# Patient Record
Sex: FEMALE | Race: BLACK OR AFRICAN AMERICAN | URBAN - METROPOLITAN AREA
[De-identification: names, ages, dates, MRNs, and addresses within clinical notes are randomized per-mention and may not be internally consistent; named-entity substitution may affect disease eponyms.]

---

## 2023-02-01 ENCOUNTER — HOSPITAL ENCOUNTER (OUTPATIENT)
Dept: PHYSICAL THERAPY | Age: 63
Setting detail: RECURRING SERIES
Discharge: HOME | End: 2023-02-04
Payer: COMMERCIAL

## 2023-02-01 PROCEDURE — 97161 PT EVAL LOW COMPLEX 20 MIN: CPT

## 2023-02-01 PROCEDURE — 97110 THERAPEUTIC EXERCISES: CPT

## 2023-02-01 NOTE — PROGRESS NOTES
Lindsayosorio Mallory  : 1960  Primary: [employer] - Open Access (hmo) (Commercial)  Secondary:  [address] @ [facility]  [address] [facility] [address]  Phone: [phone]  Fax: [phone] No data recorded  Plan of Care/Certification Expiration Date: 23      PT Visit Info:  Plan of Care/Certification Expiration Date: 23      Visit Count:  1    OUTPATIENT PHYSICAL THERAPY:OP NOTE TYPE: Treatment Note 2023       Episode  }Appt Desk             Treatment Diagnosis:  Low back pain, unspecified [M54.50]  Pain in right shoulder [M25.511]  Other chronic pain [G89.29]  Pain in left shoulder [M25.512]  Medical/Referring Diagnosis:  Low back pain, unspecified [M54.50]  Pain in right shoulder [M25.511]  Other chronic pain [G89.29]  Pain in left shoulder [M25.512]  Referring Physician:  Domingo Morales MD Orders:  PT Eval and Treat   Date of Onset:  No data recorded   Allergies:   Patient has no allergy information on record. Restrictions/Precautions:  No data recorded  No data recorded   Interventions Planned (Treatment may consist of any combination of the following):    Current Treatment Recommendations: Aquatics; Strengthening; ROM; Endurance training; Balance training     Subjective Comments:      Initial:}     6/10Post Session:        6/10  Medications Last Reviewed:  2023  Updated Objective Findings:  See evaluation note from today  Treatment   THERAPEUTIC EXERCISE: (23 minutes):    Exercises per grid below to improve mobility, strength, balance, and coordination. Required minimal visual, verbal, and manual cues to promote proper body alignment, promote proper body posture, and promote proper body mechanics. Progressed resistance, range, and repetitions as indicated. Seated SKTC, LTR, Open Book      Aquatic Therapy (- minutes): Aquatic treatment performed per flow grid for Decreased muscle strength and Decreased endurance.   Cues provided for [pstire. Assistance by therapist provided for exercise instruction. Patient has difficulty with N/A. Aquatic Exercise Log       Date  - Date   Date   Date   Date     Activity/ Exercise Parameters Parameters Parameters Parameters Parameters   Walking forward        Walking backward        Walking sideways          Marching          Goose Step          Tip toes          Heels          Lunges        Side step squats        LE Exercises          Hip Flex/Ext          Hip Abd/Add          Hip IR/ER          Calf raises          Knee Flex          Squats          Leg Circles          Step Ups        UE Exercises          Squeeze In          Push Down          Pull Down          Bicep/Tricep        Rows/Press outs         Chi Positions          Trunk Rotation        Deep H2O/ Noodles          Stabilization          Arms only          Legs only        Rosado Abarca walk        Lower abdominal   work           Cardio          Jogging        Lap   Swimming          Stretches          Hamstrings          Heelcords          Piriformis          Neck          THERAPEUTIC EXERCISE: (23 minutes):    Exercises per grid below to improve mobility, strength, balance, and coordination. Required minimal visual, verbal, and manual cues to promote proper body alignment, promote proper body posture, and promote proper body mechanics. Progressed resistance, range, and repetitions as indicated. SKTC, LTR, Open Book                Treatment/Session Summary:    Treatment Assessment: Verbalized understanding of HEP and POC. Communication/Consultation:   Faxed IE to MD  Equipment provided today:  HEP  Recommendations/Intent for next treatment session: Next visit will focus on strength, gait, balance, pain.     Total Treatment Billable Duration:  23 minutes  Time In: 1430  Time Out: [address]  }Post Session Pain  PT Visit Marcela Hubbard MD Guidelines  Scanned Media  Benefits  MyChart    No future appointments.

## 2023-02-01 NOTE — THERAPY EVALUATION
Doug Tarango  : 1960  Primary: [address] - Open Access (hmo) (Commercial)  Secondary:  [address] @ [facility]  [address] [place] [address]  Phone: [phone]  Fax: [phone] of Care/Certification Expiration Date: 23    PT Visit Info:  Plan of Care/Certification Expiration Date: 23    Visit Count:  1                OUTPATIENT PHYSICAL THERAPY:             OP NOTE TYPE: Initial Assessment 2023               Episode (Low back and shoulder pain -aquatic candidate) Appt Desk         Treatment Diagnosis:  Low back pain, unspecified [M54.50]  Pain in right shoulder [M25.511]  Other chronic pain [G89.29]  Pain in left shoulder [M25.512]    Medical/Referring Diagnosis:  Low back pain, unspecified [M54.50]  Pain in right shoulder [M25.511]  Other chronic pain [G89.29]  Pain in left shoulder [M25.512]  Referring Physician:  Burak Tello PA-C MD Orders:  PT Eval and Treat   Return MD Appt:    No future appointments. Date of Onset:       Allergies:  Patient has no allergy information on record. Restrictions/Precautions:           Medications Last Reviewed:  2023     SUBJECTIVE   History of Injury/Illness (Reason for Referral): I woke up \"on  last year with pain from my chest from top to bottom and I did nothing to bring it on. The bunt of the pain was my L hip and radiate around my back to the other side of my back. My shoulders weren't as bad, but it was like I was in a Triathalon and my body was just WORN out from it. I've been in sports before and that's what it felt like. I moved around and asked my  for something to take (medicine) and I went to work and as I walked in the door to work the pain just started stabbing on my left and R side. The pain spasmed and made me jump. I had to leave work due to the pain.   I got back to my car and I told my coworker about it and she was like, 'you may need to go home.'  I called my ronnybadn and he took me straight to the MD and from there its been a roller coaster up and down with pain. I had spasms non stop from there until the 22nd of October every single day where I could barely get up. I started gettinga little better gradually and went to therapy at [facility] October 26ish -I wasn't that bad in PT, but I still had pain. Majority of the times after therapy I hurt so bad - like I was beat up all over again. It would get better, but then went I'd go back to my therapy sessions it hurt again. I have large shoes I can slide on because I can't bend over to put my shoes on without pain. I haveb't been back to work since the 28th of September. 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 I do a lot of sitting at work. I could not go back to work right now. I am just not there back and I've tried to sit in my chair at home, but I can't do it. I've tried my best but my shoulders and back are in so much pain it's like having a kid on my shoulders the type of pressure it is. It's never ending. \"    Patient Stated Goal(s): \"To improve pain and mobility. \"  Initial:      6/10 Post Session:      6/10  She doesn't know what makes her pain level worse. But she does get up to a 10/10 at the worst.  \"I've had injections in the L shoulder but it was too painful for me to consider the R shoulder. \"  Past Medical History/Comorbidities:   Ms. Axel Lovell  has a past medical history of Hypertension and Other ill-defined conditions(279.89). Ms. Axel Lovell  has a past surgical history that includes gyn. Social History/Living Environment:   NOT WORKING AT THIS TIME        Prior Level of Function/Work/Activity:  Independent prior to Sept 28--was working full time prior to waking up Sept 28 in pain. Learning:   Does the patient/guardian have any barriers to learning?: No barriers     Fall Risk Scale:    Bowie Total Score: 25  Bowie Fall Risk: Medium (25-44)     Personal Factors:        Sex:  female        Age:  65 y.o. OBJECTIVE     Observation/Postural and Gait Assessment: Fearful at first to perform - guarded throughout lumbar movements. Palpation: Lower LS, guarded with palpation and overly tender. AROM:   Lumbar extension: She is fearful she will pass out with leaning backwards - not performed°   Lumbar flexion: + jorge's and poor motion°   Lumbar left side bend: Tightness, no pain, full motion.°   Lumbar right side bend: No pain, full motvement°     Full LE movement, no pain. Strength:  ABLE TO AMBULATE WITHOUT AD AND NO DIFFICULTY OR HX OF FALLS. Manual Muscle Test (out of 5) Left Right   Knee extension     Knee flexion     Hip flexion     Hip extension     Hip abduction     Ankle DF     Ankle PF       Special Tests:  + JORGE'S, Negative radicular symptoms/FABERS/Slump. Neurological Screen:  . Dermatomes: Sensation testing through bilateral lower quadrants for light touch is normal.  Reflexes: Patellar (L4) and Achilles (S1) are 2+ and 2+. Neural tension tests: Passive straight leg raise (SLR) test is -. Crossed SLR test is -. Slump test is -. Femoral nerve stretch test is -. Functional Mobility:  Limited due to LINO and pain. ASSESSMENT   Initial Assessment:  Pain dominant shoulder and low back prsentation. Fearful of movement---aquatic candidate. Problem List: (Impacting functional limitations): Body Structures, Functions, Activity Limitations Requiring Skilled Therapeutic Intervention: Decreased functional mobility ; Decreased ADL status; Decreased ROM;  Decreased strength; Decreased cognition; Decreased endurance     Therapy Prognosis:   Therapy Prognosis: Good     Initial Assessment Complexity:   Decision Making: Low Complexity    PLAN   Effective Dates: 2.1.23 TO Plan of Care/Certification Expiration Date: 05/02/23   Frequency/Duration:   2--3 x week  Interventions Planned (Treatment may consist of any combination of the following):    Current Treatment Recommendations: Aquatics; Strengthening; ROM; Endurance training; Balance training     Short term goals (45 days)  Radha Betancourt will be independent with aquatic exercises   Radha Betancourt will be able to tolerate 45 min of exercises with minimal rest breaks and good compliance  Radha Betancourt will be able to go up and down stairs with use of rail and modified independence. Radha Betancourt will improve LEFS by 10 points to demonstrate improvement in strength and functional tolerance. Outcome Measure: Tool Used: Lower Extremity Functional Scale (LEFS)  Score:  Initial: 38/80 Most Recent: X/80 (Date: -- )   Interpretation of Score: 20 questions each scored on a 5 point scale with 0 representing \"extreme difficulty or unable to perform\" and 4 representing \"no difficulty\". The lower the score, the greater the functional disability. 80/80 represents no disability. Minimal detectable change is 9 points. Medical Necessity:   > Skilled intervention continues to be required due to current impairments. Reason For Services/Other Comments:  > Patient continues to require skilled intervention due to current LOF. Total Duration:  Time In: 1430  Time Out: 2701    Regarding Tee Perla's therapy, I certify that the treatment plan above will be carried out by a therapist or under their direction.   Thank you for this referral,  Sherice Young, PT     Referring Physician Signature: Donte Brooks PA-C _______________________________ Date _____________        Post Session Pain  Charge Capture  PT Visit Info MD Guidelines  MyChart

## 2023-02-06 ENCOUNTER — HOSPITAL ENCOUNTER (OUTPATIENT)
Dept: PHYSICAL THERAPY | Age: 63
Setting detail: RECURRING SERIES
Discharge: HOME | End: 2023-02-09
Payer: COMMERCIAL

## 2023-02-06 PROCEDURE — 97113 AQUATIC THERAPY/EXERCISES: CPT

## 2023-02-06 NOTE — PROGRESS NOTES
Td Carrera  : 1960  Primary: [employer] - Open Access (hmo) (Commercial)  Secondary:  [address] @ [facility]  [address] [facility] [place] [address]  Phone: [phone]  Fax: [phone] No data recorded  Plan of Care/Certification Expiration Date: 23      PT Visit Info:  Plan of Care/Certification Expiration Date: 23      Visit Count:  2    OUTPATIENT PHYSICAL THERAPY:OP NOTE TYPE: Treatment Note 2023       Episode  }Appt Desk             Treatment Diagnosis:  Low back pain, unspecified [M54.50]  Pain in right shoulder [M25.511]  Other chronic pain [G89.29]  Pain in left shoulder [M25.512]  Medical/Referring Diagnosis:  Low back pain, unspecified [M54.50]  Pain in right shoulder [M25.511]  Other chronic pain [G89.29]  Pain in left shoulder [M25.512]  Referring Physician:  Becca Duarte MD Orders:  PT Eval and Treat   Date of Onset:  No data recorded   Allergies:   Patient has no allergy information on record. Restrictions/Precautions:  No data recorded  No data recorded   Interventions Planned (Treatment may consist of any combination of the following):    Current Treatment Recommendations: Aquatics; Strengthening; ROM; Endurance training; Balance training     Subjective Comments:  My shoulders felt tight over the weekend and tightnss to my back. My hips felt like they were on fire. The right hip more than the left. Initial:}     5/10Post Session:        3/10  Medications Last Reviewed:  2023  Updated Objective Findings:  See evaluation note from today  Treatment   THERAPEUTIC EXERCISE: (- minutes):    Exercises per grid below to improve mobility, strength, balance, and coordination. Required minimal visual, verbal, and manual cues to promote proper body alignment, promote proper body posture, and promote proper body mechanics. Progressed resistance, range, and repetitions as indicated.       Seated SKTC, LTR, Open Book      Aquatic Therapy (54 minutes): Aquatic treatment performed per flow grid for Decreased muscle strength and Decreased endurance. Cues provided for [pstire. Assistance by therapist provided for exercise instruction. Patient has difficulty with N/A. Aquatic Exercise Log       Date  -2/6/23 Date   Date   Date   Date     Activity/ Exercise Parameters Parameters Parameters Parameters Parameters   Walking forward 5'       Walking backward 5'       Walking sideways 5'         Marching 10x         Goose Step          Tip toes 10x         Heels 10x         Lunges        Side step squats        LE Exercises          Hip Flex/Ext 10x         Hip Abd/Add 10x         Hip IR/ER          Calf raises          Knee Flex          Squats          Leg Circles          Step Ups        UE Exercises          Squeeze In 10x         Push Down          Pull Down          Bicep/Tricep        Rows/Press outs         Chi Positions          Trunk Rotation        Deep H2O/ Noodles          Stabilization          Arms only          Legs only        Rosado Abarca walk        Lower abdominal   work           Cardio          Jogging        Lap   Swimming          Stretches          Hamstrings          Heelcords          Piriformis          Neck          THERAPEUTIC EXERCISE: (-minutes):    Exercises per grid below to improve mobility, strength, balance, and coordination. Required minimal visual, verbal, and manual cues to promote proper body alignment, promote proper body posture, and promote proper body mechanics. Progressed resistance, range, and repetitions as indicated. SKTC, LTR, Open Book                Treatment/Session Summary:    Treatment Assessment: Verbalized understanding of HEP and POC. Initiated aquatics today with vues for technique--excellent tolerance throughout session. Will progress per response from today's session.          Communication/Consultation:   Faxed IE to MD  Equipment provided today:  HEP  Recommendations/Intent for next treatment session: Next visit will focus on strength, gait, balance, pain.     Total Treatment Billable Duration:  53 minutes  Time In: 1858  Time Out: [facility]  }Post Session Pain  PT Visit [address] Portal  MD Guidelines  Scanned Media  Benefits  MyChart    Future Appointments   Date Time Provider La Baker   2/9/2023  3:15 PM Delong Pott, PT [facility]   2/13/2023  3:15 PM Delong Pott, PT [facility]   2/16/2023  3:15 PM Delong Pott, PT [facility]   2/20/2023  3:15 PM Delong Pott, PT [facility]   2/23/2023  3:15 PM Delong Pott, PT [facility]   2/27/2023  3:15 PM Delong Pott, PT [facility]   3/2/2023  3:15 PM Delong Pott, PT [facility]

## 2023-02-09 ENCOUNTER — HOSPITAL ENCOUNTER (OUTPATIENT)
Dept: PHYSICAL THERAPY | Age: 63
Setting detail: RECURRING SERIES
Discharge: HOME | End: 2023-02-12
Payer: COMMERCIAL

## 2023-02-09 PROCEDURE — 97113 AQUATIC THERAPY/EXERCISES: CPT

## 2023-02-09 NOTE — PROGRESS NOTES
Jessi Single  : 1960  Primary: [employer] Open Access (hmo) (Commercial)  Secondary:  [address] @ [facility]  [address] [facility] [address]  Phone: [phone]  Fax: [phone] No data recorded  Plan of Care/Certification Expiration Date: 23      PT Visit Info:  Plan of Care/Certification Expiration Date: 23      Visit Count:  3    OUTPATIENT PHYSICAL THERAPY:OP NOTE TYPE: Treatment Note 2023       Episode  }Appt Desk             Treatment Diagnosis:  Low back pain, unspecified [M54.50]  Pain in right shoulder [M25.511]  Other chronic pain [G89.29]  Pain in left shoulder [M25.512]  Medical/Referring Diagnosis:  Low back pain, unspecified [M54.50]  Pain in right shoulder [M25.511]  Other chronic pain [G89.29]  Pain in left shoulder [M25.512]  Referring Physician:  Mykel Davis MD Orders:  PT Eval and Treat   Date of Onset:  No data recorded   Allergies:   Patient has no allergy information on record. Restrictions/Precautions:  No data recorded  No data recorded   Interventions Planned (Treatment may consist of any combination of the following):    Current Treatment Recommendations: Aquatics; Strengthening; ROM; Endurance training; Balance training     Subjective Comments:  *I feel like the pool is helping--I was ight and I could tell in my thigh and shoulders it was worked out. Initial:}     4/10Post Session:        210  Medications Last Reviewed:  2023  Updated Objective Findings:  See evaluation note from today  Treatment   THERAPEUTIC EXERCISE: (- minutes):    Exercises per grid below to improve mobility, strength, balance, and coordination. Required minimal visual, verbal, and manual cues to promote proper body alignment, promote proper body posture, and promote proper body mechanics. Progressed resistance, range, and repetitions as indicated.       Seated SKTC, LTR, Open Book      Aquatic Therapy (53 minutes): Aquatic treatment performed per flow grid for Decreased muscle strength and Decreased endurance. Cues provided for [pstire. Assistance by therapist provided for exercise instruction. Patient has difficulty with N/A. Aquatic Exercise Log       Date  -2/6/23 Date   Date   Date   Date     Activity/ Exercise Parameters Parameters Parameters Parameters Parameters   Walking forward 5' 5'      Walking backward 5' 5'      Walking sideways 5' 5'        Marching 10x 10x        Goose Step          Tip toes 10x 10x        Heels 10x 10x        Lunges        Side step squats        LE Exercises          Hip Flex/Ext 10x 10x        Hip Abd/Add 10x 10x        Hip IR/ER          Calf raises          Knee Flex          Squats          Leg Circles          Step Ups        UE Exercises          Squeeze In 10x 10x        Push Down          Pull Down          Bicep/Tricep        Rows/Press outs         Chi Positions          Trunk Rotation        Deep H2O/ Noodles          Stabilization          Arms only          Legs only        Rosadodavid Abarca walk        Lower abdominal   work           Cardio          Jogging        Lap   Swimming          Stretches          Hamstrings          Heelcords          Piriformis          Neck          THERAPEUTIC EXERCISE: (-minutes):    Exercises per grid below to improve mobility, strength, balance, and coordination. Required minimal visual, verbal, and manual cues to promote proper body alignment, promote proper body posture, and promote proper body mechanics. Progressed resistance, range, and repetitions as indicated. SKTC, LTR, Open Book                Treatment/Session Summary:    Treatment Assessment: Good participation overall. Movement is much more comfortable today and pain improved. Progress per POC.        Communication/Consultation:   Faxed IE to MD  Equipment provided today:  HEP  Recommendations/Intent for next treatment session: Next visit will focus on strength, gait, balance, pain.     Total Treatment Billable Duration:  53 minutes  Time In: 2988  Time Out: Stefan Santizo PT       Charge Capture  }Post Session Pain  PT Visit [address] [facility] Portal  MD Guidelines  Scanned Media  Benefits  MyChart    Future Appointments   Date Time Provider La Baker   2/13/2023  3:15 PM Lorelle Officer, PT [facility]   2/16/2023  3:15 PM Lorelle Officer, PT [facility]   2/20/2023  3:15 PM Lorelle Officer, PT [facility]   2/23/2023  3:15 PM Lorelle Officer, PT [facility]   2/27/2023  3:15 PM Lorelle Officer, PT [facility]   3/2/2023  3:15 PM Lorelle Officer, PT [facility]

## 2023-02-13 ENCOUNTER — HOSPITAL ENCOUNTER (OUTPATIENT)
Dept: PHYSICAL THERAPY | Age: 63
Setting detail: RECURRING SERIES
Discharge: HOME | End: 2023-02-16
Payer: COMMERCIAL

## 2023-02-13 PROCEDURE — 97113 AQUATIC THERAPY/EXERCISES: CPT

## 2023-02-13 NOTE — PROGRESS NOTES
Elisabet Ayers  : 1960  Primary: Lindsayosorio Valero - Open Access (hmo) (Commercial)  Secondary:  [address] @ [facility]  9  [facility] [address]  Phone: [phone]  Fax: [phone] No data recorded  Plan of Care/Certification Expiration Date: 23      PT Visit Info:  Plan of Care/Certification Expiration Date: 23      Visit Count:  4    OUTPATIENT PHYSICAL THERAPY:OP NOTE TYPE: Treatment Note 2023       Episode  }Appt Desk             Treatment Diagnosis:  Low back pain, unspecified [M54.50]  Pain in right shoulder [M25.511]  Other chronic pain [G89.29]  Pain in left shoulder [M25.512]  Medical/Referring Diagnosis:  Low back pain, unspecified [M54.50]  Pain in right shoulder [M25.511]  Other chronic pain [G89.29]  Pain in left shoulder [M25.512]  Referring Physician:  Tim Jones MD Orders:  PT Eval and Treat   Date of Onset:  No data recorded   Allergies:   Patient has no allergy information on record. Restrictions/Precautions:  No data recorded  No data recorded   Interventions Planned (Treatment may consist of any combination of the following):    Current Treatment Recommendations: Aquatics; Strengthening; ROM; Endurance training; Balance training     Subjective Comments:  *I feel like the pool is helping--I was ight and I could tell in my thigh and shoulders it was worked out. Initial:}     4/10Post Session:        210  Medications Last Reviewed:  2023  Updated Objective Findings:  See evaluation note from today  Treatment   THERAPEUTIC EXERCISE: (- minutes):    Exercises per grid below to improve mobility, strength, balance, and coordination. Required minimal visual, verbal, and manual cues to promote proper body alignment, promote proper body posture, and promote proper body mechanics. Progressed resistance, range, and repetitions as indicated.       Seated SKTC, LTR, Open Book      Aquatic Therapy (53 minutes): Aquatic treatment performed per flow grid for Decreased muscle strength and Decreased endurance. Cues provided for [pstire. Assistance by therapist provided for exercise instruction. Patient has difficulty with N/A. Aquatic Exercise Log       Date  -2/6/23 Date  2/9 Date  2/13 Date   Date     Activity/ Exercise Parameters Parameters Parameters Parameters Parameters   Walking forward 5' 5' 5'     Walking backward 5' 5' 5'     Walking sideways 5' 5' 5'       Marching 10x 10x 20x       Goose Step          Tip toes 10x 10x 20x       Heels 10x 10x 20x       Lunges        Side step squats        LE Exercises          Hip Flex/Ext 10x 10x 20x       Hip Abd/Add 10x 10x 20x       Hip IR/ER          Calf raises          Knee Flex          Squats          Leg Circles          Step Ups        UE Exercises          Squeeze In 10x 10x 20x       Push Down          Pull Down          Bicep/Tricep        Rows/Press outs         Chi Positions          Trunk Rotation        Deep H2O/ Noodles          Stabilization          Arms only          Legs only        Rosadodavid Abarca walk        Lower abdominal   work           Cardio          Jogging        Lap   Swimming          Stretches          Hamstrings          Heelcords          Piriformis          Neck          THERAPEUTIC EXERCISE: (-minutes):    Exercises per grid below to improve mobility, strength, balance, and coordination. Required minimal visual, verbal, and manual cues to promote proper body alignment, promote proper body posture, and promote proper body mechanics. Progressed resistance, range, and repetitions as indicated. SKTC, LTR, Open Book                Treatment/Session Summary:    Treatment Assessment:  Mobility is improved walking back to therapy session today. She is still tight, but pain has improved from IE.         Communication/Consultation:   Faxed IE to MD  Equipment provided today:  HEP  Recommendations/Intent for next treatment session: Next visit will focus on strength, gait, balance, pain.     Total Treatment Billable Duration:  53 minutes  Time In: 6266  Time Out: [address] [place]  }Post Session Pain  PT Visit [address] [facility] Portal  MD Guidelines  Scanned Media  Benefits  MyChart    Future Appointments   Date Time Provider [place]   2/16/2023  3:15 PM Bethanne De La Fuente, PT [facility]   2/20/2023  3:15 PM Bethanne De La Fuente, PT [facility]   2/23/2023  3:15 PM Bethanne De La Fuente, PT [facility]   2/27/2023  3:15 PM Bethanne De La Fuente, PT [facility]   3/2/2023  3:15 PM Bethanne De La Fuente, PT [facility]

## 2023-02-16 ENCOUNTER — HOSPITAL ENCOUNTER (OUTPATIENT)
Dept: PHYSICAL THERAPY | Age: 63
Setting detail: RECURRING SERIES
Discharge: HOME | End: 2023-02-19
Payer: COMMERCIAL

## 2023-02-16 PROCEDURE — 97113 AQUATIC THERAPY/EXERCISES: CPT

## 2023-02-16 NOTE — PROGRESS NOTES
Baylee Rush  : 1960  Primary: Bolling Kanner - Open Access (hmo) (Commercial)  Secondary:  [address] @ [facility]  9  [facility] [address] [place]  [place] [address]  Phone: [phone]  Fax: [phone] No data recorded  Plan of Care/Certification Expiration Date: 23      PT Visit Info:  Plan of Care/Certification Expiration Date: 23      Visit Count:  5    OUTPATIENT PHYSICAL THERAPY:OP NOTE TYPE: Treatment Note 2023       Episode  }Appt Desk             Treatment Diagnosis:  Low back pain, unspecified [M54.50]  Pain in right shoulder [M25.511]  Other chronic pain [G89.29]  Pain in left shoulder [M25.512]  Medical/Referring Diagnosis:  Low back pain, unspecified [M54.50]  Pain in right shoulder [M25.511]  Other chronic pain [G89.29]  Pain in left shoulder [M25.512]  Referring Physician:  Gabrielle Holman MD Orders:  PT Eval and Treat   Date of Onset:  No data recorded   Allergies:   Patient has no allergy information on record. Restrictions/Precautions:  No data recorded  No data recorded   Interventions Planned (Treatment may consist of any combination of the following):    Current Treatment Recommendations: Aquatics; Strengthening; ROM; Endurance training; Balance training     Subjective Comments:  *I feel like the pool is helping--I was ight and I could tell in my thigh and shoulders it was worked out. Initial:}     4/10Post Session:        210  Medications Last Reviewed:  2023  Updated Objective Findings:  See evaluation note from today  Treatment   THERAPEUTIC EXERCISE: (- minutes):    Exercises per grid below to improve mobility, strength, balance, and coordination. Required minimal visual, verbal, and manual cues to promote proper body alignment, promote proper body posture, and promote proper body mechanics. Progressed resistance, range, and repetitions as indicated.       Seated SKTC, LTR, Open Book      Aquatic Therapy (53 minutes): Aquatic treatment performed per flow grid for Decreased muscle strength and Decreased endurance. Cues provided for [pstire. Assistance by therapist provided for exercise instruction. Patient has difficulty with N/A. Aquatic Exercise Log       Date  -2/6/23 Date  2/9 Date  2/13 Date  2/16/23 Date     Activity/ Exercise Parameters Parameters Parameters Parameters Parameters   Walking forward 5' 5' 5' 5    Walking backward 5' 5' 5' 6    Walking sideways 5' 5' 5' 5      Marching 10x 10x 20x 30      Goose Step          Tip toes 10x 10x 20x 30x      Heels 10x 10x 20x 30x      Lunges        Side step squats        LE Exercises          Hip Flex/Ext 10x 10x 20x 25x      Hip Abd/Add 10x 10x 20x 25x      Hip IR/ER          Calf raises          Knee Flex          Squats    20x      Leg Circles          Step Ups        UE Exercises          Squeeze In 10x 10x 20x 25x      Push Down          Pull Down          Bicep/Tricep        Rows/Press outs         Chi Positions          Trunk Rotation        Deep H2O/ Noodles          Stabilization          Arms only          Legs only        Rosado Abarca walk        Lower abdominal   work           Cardio          Jogging        Lap   Swimming          Stretches          Hamstrings          Heelcords          Piriformis          Neck          THERAPEUTIC EXERCISE: (-minutes):    Exercises per grid below to improve mobility, strength, balance, and coordination. Required minimal visual, verbal, and manual cues to promote proper body alignment, promote proper body posture, and promote proper body mechanics. Progressed resistance, range, and repetitions as indicated. SKTC, LTR, Open Book                Treatment/Session Summary:    Treatment Assessment:  Mobility is improved walking back to therapy session today. She is still tight, but pain has improved from IE.         Communication/Consultation:   Faxed IE to MD  Equipment provided today: HEP  Recommendations/Intent for next treatment session: Next visit will focus on strength, gait, balance, pain.     Total Treatment Billable Duration:  53 minutes       Brian Organ, PT       Charge Capture  }Post Session Pain  PT Visit Info  [facility] Portal  MD Guidelines  Scanned Media  Benefits  MyChart    Future Appointments   Date Time Provider La Baker   2/20/2023  3:15 PM Brian Organ, PT [facility]   2/23/2023  3:15 PM Brian Organ, PT [facility]   2/27/2023  3:15 PM Brian Organ, PT [facility]   3/2/2023  3:15 PM Brian Organ, PT [facility]

## 2023-02-20 ENCOUNTER — HOSPITAL ENCOUNTER (OUTPATIENT)
Dept: PHYSICAL THERAPY | Age: 63
Setting detail: RECURRING SERIES
Discharge: HOME | End: 2023-02-23
Payer: COMMERCIAL

## 2023-02-20 PROCEDURE — 97113 AQUATIC THERAPY/EXERCISES: CPT

## 2023-02-20 NOTE — PROGRESS NOTES
Missouri Dean  : 1960  Primary: [place] - Open Access (hmo) (Commercial)  Secondary:  [address] @ [facility]  9 [address] [facility] [address] [place] [address]  Phone: [phone]  Fax: [phone] No data recorded  Plan of Care/Certification Expiration Date: 23      PT Visit Info:  Plan of Care/Certification Expiration Date: 23      Visit Count:  6    OUTPATIENT PHYSICAL THERAPY:OP NOTE TYPE: Treatment Note 2023       Episode  }Appt Desk             Treatment Diagnosis:  Low back pain, unspecified [M54.50]  Pain in right shoulder [M25.511]  Other chronic pain [G89.29]  Pain in left shoulder [M25.512]  Medical/Referring Diagnosis:  Low back pain, unspecified [M54.50]  Pain in right shoulder [M25.511]  Other chronic pain [G89.29]  Pain in left shoulder [M25.512]  Referring Physician:  Nathan Carias MD Orders:  PT Eval and Treat   Date of Onset:  No data recorded   Allergies:   Patient has no allergy information on record. Restrictions/Precautions:  No data recorded  No data recorded   Interventions Planned (Treatment may consist of any combination of the following):    Current Treatment Recommendations: Aquatics; Strengthening; ROM; Endurance training; Balance training     Subjective Comments:  *I had a bad weekend, my R shoulder was actually swollen I don't know why. I worked with a lot of papers moving them from box to box. I threw away old papers and that was Saturday evening and over night it was not good. My R shoulder hurt and all day  I had to wear the patches in it. This left shoulder blade   Initial:}     4/10Post Session:        210  Medications Last Reviewed:  2023  Updated Objective Findings:  See evaluation note from today  Treatment   THERAPEUTIC EXERCISE: (- minutes):    Exercises per grid below to improve mobility, strength, balance, and coordination.   Required minimal visual, verbal, and manual cues to promote proper body alignment, promote proper body posture, and promote proper body mechanics. Progressed resistance, range, and repetitions as indicated. Seated SKMORGAN, FELIX, Open Book      Aquatic Therapy (53 minutes): Aquatic treatment performed per flow grid for Decreased muscle strength and Decreased endurance. Cues provided for [pstire. Assistance by therapist provided for exercise instruction. Patient has difficulty with N/A. Aquatic Exercise Log       Date  -2/6/23 Date  2/9 Date  2/13 Date  2/16/23 Date  2/20/23   Activity/ Exercise Parameters Parameters Parameters Parameters Parameters   Walking forward 5' 5' 5' 5 5'   Walking backward 5' 5' 5' 6 5'   Walking sideways 5' 5' 5' 5 5'     Marching 10x 10x 20x 30 30x     Goose Step          Tip toes 10x 10x 20x 30x 30x     Heels 10x 10x 20x 30x 30x     Lunges        Side step squats        LE Exercises          Hip Flex/Ext 10x 10x 20x 25x 25x     Hip Abd/Add 10x 10x 20x 25x 25x     Hip IR/ER          Calf raises          Knee Flex          Squats    20x 30x     Leg Circles          Step Ups        UE Exercises          Squeeze In 10x 10x 20x 25x 25x     Push Down          Pull Down          Bicep/Tricep        Rows/Press outs         Chi Positions          Trunk Rotation        Deep H2O/ Noodles          Stabilization          Arms only          Legs only        Alonzo Abarca walk        Lower abdominal   work           Cardio          Jogging        Lap   Swimming          Stretches          Hamstrings          Heelcords          Piriformis          Neck          THERAPEUTIC EXERCISE: (-minutes):    Exercises per grid below to improve mobility, strength, balance, and coordination. Required minimal visual, verbal, and manual cues to promote proper body alignment, promote proper body posture, and promote proper body mechanics. Progressed resistance, range, and repetitions as indicated.       SKMORGAN, FELIX, Open Book                Treatment/Session Summary:    Treatment Assessment:  Continued exercises with minimal cuing for technique. Overall is improving independent with exercises. Pain improved from last session. Communication/Consultation:   Faxed IE to MD  Equipment provided today:  HEP  Recommendations/Intent for next treatment session: Next visit will focus on strength, gait, balance, pain.     Total Treatment Billable Duration:  53 minutes  Time In: 4110  Time Out: [address]  }Post Session Pain  PT Visit [address] Portal  MD Guidelines  Scanned Media  Benefits  MyChart    Future Appointments   Date Time Provider [place]   2/27/2023  3:15 PM Alejandra Fam, PT [facility]   3/2/2023  3:15 PM Alejandra Fam, PT [facility]

## 2023-02-23 ENCOUNTER — APPOINTMENT (OUTPATIENT)
Dept: PHYSICAL THERAPY | Age: 63
End: 2023-02-23
Payer: COMMERCIAL

## 2023-02-27 ENCOUNTER — HOSPITAL ENCOUNTER (OUTPATIENT)
Dept: PHYSICAL THERAPY | Age: 63
Setting detail: RECURRING SERIES
Discharge: HOME | End: 2023-03-02
Payer: COMMERCIAL

## 2023-02-27 PROCEDURE — 97113 AQUATIC THERAPY/EXERCISES: CPT

## 2023-02-27 NOTE — PROGRESS NOTES
Romero Kim  : 1960  Primary: Andrew Wilson - Open Access (hmo) (Commercial)  Secondary:  [address] @ [facility]  [address] [facility] [address]  Phone: [phone]  Fax: [phone] No data recorded  Plan of Care/Certification Expiration Date: 23      PT Visit Info:  Plan of Care/Certification Expiration Date: 23      Visit Count:  7    OUTPATIENT PHYSICAL THERAPY:OP NOTE TYPE: Treatment Note 2023       Episode  }Appt Desk             Treatment Diagnosis:  Low back pain, unspecified [M54.50]  Pain in right shoulder [M25.511]  Other chronic pain [G89.29]  Pain in left shoulder [M25.512]  Medical/Referring Diagnosis:  Low back pain, unspecified [M54.50]  Pain in right shoulder [M25.511]  Other chronic pain [G89.29]  Pain in left shoulder [M25.512]  Referring Physician:  Attila Blake MD Orders:  PT Eval and Treat   Date of Onset:  No data recorded   Allergies:   Patient has no allergy information on record. Restrictions/Precautions:  No data recorded  No data recorded   Interventions Planned (Treatment may consist of any combination of the following):    Current Treatment Recommendations: Aquatics; Strengthening; ROM; Endurance training; Balance training     Subjective Comments:  *My shoulder just won't stop hurting. I can make it do stuff it just hurts so bad. Even when I'm sleeping when I roll on it I'll jump out of my sleep and put a patch on. \"  Initial:}     4/10Post Session:        210  Medications Last Reviewed:  2023  Updated Objective Findings:  See evaluation note from today  Treatment   THERAPEUTIC EXERCISE: (- minutes):    Exercises per grid below to improve mobility, strength, balance, and coordination. Required minimal visual, verbal, and manual cues to promote proper body alignment, promote proper body posture, and promote proper body mechanics.   Progressed resistance, range, and repetitions as indicated. Seated [facility], LTR, Open Book      Aquatic Therapy (53 minutes): Aquatic treatment performed per flow grid for Decreased muscle strength and Decreased endurance. Cues provided for [pstire. Assistance by therapist provided for exercise instruction. Patient has difficulty with N/A. Aquatic Exercise Log       Date  -2/6/23 Date  2/9 Date  2/13 Date  2/16/23 Date  2/20/23 Date:  2/27/23   Activity/ Exercise Parameters Parameters Parameters Parameters Parameters    Walking forward 5' 5' 5' 5 5' 5'   Walking backward 5' 5' 5' 6 5' 5'   Walking sideways 5' 5' 5' 5 5' 5'     Marching 10x 10x 20x 30 30x 30x     Goose Step           Tip toes 10x 10x 20x 30x 30x 30x     Heels 10x 10x 20x 30x 30x 30x     Lunges         Side step squats         LE Exercises           Hip Flex/Ext 10x 10x 20x 25x 25x 25x     Hip Abd/Add 10x 10x 20x 25x 25x 25x     Hip IR/ER           Calf raises           Knee Flex           Squats    20x 30x 30x     Leg Circles           Step Ups         UE Exercises           Squeeze In 10x 10x 20x 25x 25x 30x     Push Down           Pull Down           Bicep/Tricep         Rows/Press outs          Chi Positions           Trunk Rotation         Deep H2O/ Noodles           Stabilization           Arms only           Legs only         [employer] walk         Lower abdominal   work            Cardio           Jogging         Lap   Swimming           Stretches           Hamstrings           Heelcords           Piriformis           Neck           THERAPEUTIC EXERCISE: (-minutes):    Exercises per grid below to improve mobility, strength, balance, and coordination. Required minimal visual, verbal, and manual cues to promote proper body alignment, promote proper body posture, and promote proper body mechanics. Progressed resistance, range, and repetitions as indicated.       [facility], [address] [place] [address],[address], Open Book                Treatment/Session Summary:    Treatment Assessment:  Posture seems to be improving. Requires less cuing each session--good control with exercises throughout session. Communication/Consultation:   Faxed IE to MD  Equipment provided today:  HEP  Recommendations/Intent for next treatment session: Next visit will focus on strength, gait, balance, pain.     Total Treatment Billable Duration:  53 minutes  Time In: 3196  Time Out: [address] [facility] PT       Charge Capture  }Post Session Pain  PT Visit Info  [address] Portal  MD Guidelines  Scanned Media  Benefits  MyChart    Future Appointments   Date Time Provider La Baker   3/2/2023  3:15 PM Everett Obregon PT [facility]

## 2023-03-02 ENCOUNTER — HOSPITAL ENCOUNTER (OUTPATIENT)
Dept: PHYSICAL THERAPY | Age: 63
Setting detail: RECURRING SERIES
Discharge: HOME | End: 2023-03-05
Payer: COMMERCIAL

## 2023-03-02 PROCEDURE — 97113 AQUATIC THERAPY/EXERCISES: CPT

## 2023-03-02 NOTE — PROGRESS NOTES
Clinton Ridley  : 1960  Primary: Thee Figueroa - Open Access (hmo) (Commercial)  Secondary:  [address] @ [facility]  9  [facility] CT [facility] [address]  Phone: [phone]  Fax: [phone] No data recorded  Plan of Care/Certification Expiration Date: 23      PT Visit Info:  Plan of Care/Certification Expiration Date: 23      Visit Count:  8    OUTPATIENT PHYSICAL THERAPY:OP NOTE TYPE: Treatment Note 3/2/2023       Episode  }Appt Desk             Treatment Diagnosis:  Low back pain, unspecified [M54.50]  Pain in right shoulder [M25.511]  Other chronic pain [G89.29]  Pain in left shoulder [M25.512]  Medical/Referring Diagnosis:  Low back pain, unspecified [M54.50]  Pain in right shoulder [M25.511]  Other chronic pain [G89.29]  Pain in left shoulder [M25.512]  Referring Physician:  Beata Mcclure MD Orders:  PT Eval and Treat   Date of Onset:  No data recorded   Allergies:   Patient has no allergy information on record. Restrictions/Precautions:  No data recorded  No data recorded   Interventions Planned (Treatment may consist of any combination of the following):    Current Treatment Recommendations: Aquatics; Strengthening; ROM; Endurance training; Balance training     Subjective Comments:  *My shoulder just won't stop hurting. I can make it do stuff it just hurts so bad. Even when I'm sleeping when I roll on it I'll jump out of my sleep and put a patch on. \"  Initial:}     4/10Post Session:        210  Medications Last Reviewed:  3/2/2023  Updated Objective Findings:  See evaluation note from today  Treatment   THERAPEUTIC EXERCISE: (- minutes):    Exercises per grid below to improve mobility, strength, balance, and coordination. Required minimal visual, verbal, and manual cues to promote proper body alignment, promote proper body posture, and promote proper body mechanics.   Progressed resistance, range, and repetitions as indicated. Seated [facility], LTR, Open Book      Aquatic Therapy (53 minutes): Aquatic treatment performed per flow grid for Decreased muscle strength and Decreased endurance. Cues provided for [pstire. Assistance by therapist provided for exercise instruction. Patient has difficulty with N/A. Aquatic Exercise Log       Date  -2/6/23 Date  2/9 Date  2/13 Date  2/16/23 Date  2/20/23 Date:  2/27/23   Activity/ Exercise Parameters Parameters Parameters Parameters Parameters    Walking forward 5' 5' 5' 5 5' 5'   Walking backward 5' 5' 5' 6 5' 5'   Walking sideways 5' 5' 5' 5 5' 5'     Marching 10x 10x 20x 30 30x 30x     Goose Step           Tip toes 10x 10x 20x 30x 30x 30x     Heels 10x 10x 20x 30x 30x 30x     Lunges         Side step squats         LE Exercises           Hip Flex/Ext 10x 10x 20x 25x 25x 25x     Hip Abd/Add 10x 10x 20x 25x 25x 25x     Hip IR/ER           Calf raises           Knee Flex           Squats    20x 30x 30x     Leg Circles           Step Ups         UE Exercises           Squeeze In 10x 10x 20x 25x 25x 30x     Push Down           Pull Down           Bicep/Tricep         Rows/Press outs          Chi Positions           Trunk Rotation         Deep H2O/ Noodles           Stabilization           Arms only           Legs only         [employer] walk         Lower abdominal   work            Cardio           Jogging         Lap   Swimming           Stretches           Hamstrings           Heelcords           Piriformis           Neck           THERAPEUTIC EXERCISE: (-minutes):    Exercises per grid below to improve mobility, strength, balance, and coordination. Required minimal visual, verbal, and manual cues to promote proper body alignment, promote proper body posture, and promote proper body mechanics. Progressed resistance, range, and repetitions as indicated.       [facility], [address] [place] [address],[address], Open Book                Treatment/Session Summary:    Treatment Assessment:  Posture seems to be improving. Requires less cuing each session--good control with exercises throughout session. Communication/Consultation:   Faxed IE to MD  Equipment provided today:  HEP  Recommendations/Intent for next treatment session: Next visit will focus on strength, gait, balance, pain. Total Treatment Billable Duration:  53 minutes       Wendy Yolande, PT       Charge Capture  }Post Session Pain  PT Visit Info  [employer] Portal  MD Guidelines  Scanned Media  Benefits  MyChart    No future appointments.

## 2023-03-08 ENCOUNTER — HOSPITAL ENCOUNTER (OUTPATIENT)
Dept: PHYSICAL THERAPY | Age: 63
Setting detail: RECURRING SERIES
Discharge: HOME | End: 2023-03-11
Payer: COMMERCIAL

## 2023-03-08 PROCEDURE — 97113 AQUATIC THERAPY/EXERCISES: CPT

## 2023-03-08 NOTE — PROGRESS NOTES
Jyothi Ney  : 1960  Primary: Rory Chery - Open Access (hmo) (Commercial)  Secondary:  [address] @ [facility]  [address] [facility] [address]  Phone: [phone]  Fax: [phone] No data recorded  Plan of Care/Certification Expiration Date: 23      PT Visit Info:  Plan of Care/Certification Expiration Date: 23      Visit Count:  9    OUTPATIENT PHYSICAL THERAPY:OP NOTE TYPE: Treatment Note 3/8/2023       Episode  }Appt Desk             Treatment Diagnosis:  Low back pain, unspecified [M54.50]  Pain in right shoulder [M25.511]  Other chronic pain [G89.29]  Pain in left shoulder [M25.512]  Medical/Referring Diagnosis:  Low back pain, unspecified [M54.50]  Pain in right shoulder [M25.511]  Other chronic pain [G89.29]  Pain in left shoulder [M25.512]  Referring Physician:  Bhupinder Horta MD Orders:  PT Eval and Treat   Date of Onset:  No data recorded   Allergies:   Patient has no allergy information on record. Restrictions/Precautions:  No data recorded  No data recorded   Interventions Planned (Treatment may consist of any combination of the following):    Current Treatment Recommendations: Aquatics; Strengthening; ROM; Endurance training; Balance training     Subjective Comments: Patient reports feeling about the same as she did at last treatment visit. Initial:}     4/10Post Session:        210  Medications Last Reviewed:  3/8/2023  Updated Objective Findings:  None Today  Treatment   THERAPEUTIC EXERCISE: (- minutes):    Exercises per grid below to improve mobility, strength, balance, and coordination. Required minimal visual, verbal, and manual cues to promote proper body alignment, promote proper body posture, and promote proper body mechanics. Progressed resistance, range, and repetitions as indicated. Seated SKTC, LTR, Open Book      Aquatic Therapy (45 minutes):  Aquatic treatment performed per flow grid for Decreased muscle strength and Decreased endurance. Cues provided for [pstire. Assistance by therapist provided for exercise instruction. Patient has difficulty with N/A. Aquatic Exercise Log       Date  2/16/23 Date  2/20/23 Date:  2/27/23 Date  3/8/23   Activity/ Exercise Parameters Parameters     Walking forward 5 5' 5' 5'   Walking backward 6 5' 5' 5 '   Walking sideways 5 5' 5' 5 '     Marching 30 30x 30x X 30     Goose Step         Tip toes 30x 30x 30x X 30     Heels 30x 30x 30x X 30     Lunges       Side step squats       LE Exercises         Hip Flex/Ext 25x 25x 25x X 25     Hip Abd/Add 25x 25x 25x X 25     Hip IR/ER         Calf raises         Knee Flex         Squats 20x 30x 30x      Leg Circles         Step Ups       UE Exercises         Squeeze In 25x 25x 30x X 30     Push Down         Pull Down         Bicep/Tricep       Rows/Press outs        Chi Positions         Trunk Rotation       Deep H2O/ Noodles         Stabilization         Arms only         Legs only       [employer] walk       Lower abdominal   work          Cardio         Jogging       Lap   Swimming         Stretches         Hamstrings         Heelcords         Piriformis         Neck         THERAPEUTIC EXERCISE: (-minutes):    Exercises per grid below to improve mobility, strength, balance, and coordination. Required minimal visual, verbal, and manual cues to promote proper body alignment, promote proper body posture, and promote proper body mechanics. Progressed resistance, range, and repetitions as indicated. SKTC, LTR, Open Book                Treatment/Session Summary:    Treatment Assessment: Patient tolerated treatment with mild discomfort. Patient needs to continue to work on core exercises. Instructed patient to continue home exercises as directed.        Communication/Consultation:   Faxed IE to MD  Equipment provided today:  HEP  Recommendations/Intent for next treatment session: Next visit will focus on strength, gait, balance, pain.     Total Treatment Billable Duration:  45 minutes  Time In: 0930  Time Out: [address], [place]       Charge Capture  }Post Session Pain  PT Visit [address] Portal  MD [address]    Future Appointments   Date Time Provider La Baker   3/14/2023  3:15 PM [facility]   3/16/2023  4:00 PM [facility], [facility]   3/21/2023  4:00 PM [facility], [facility]   3/23/2023  4:00 PM [facility]   3/28/2023  4:00 PM [facility]   3/30/2023  4:00 PM [facility]

## 2023-03-10 ENCOUNTER — HOSPITAL ENCOUNTER (OUTPATIENT)
Dept: PHYSICAL THERAPY | Age: 63
Setting detail: RECURRING SERIES
Discharge: HOME | End: 2023-03-13
Payer: COMMERCIAL

## 2023-03-10 PROCEDURE — 97113 AQUATIC THERAPY/EXERCISES: CPT

## 2023-03-10 NOTE — PROGRESS NOTES
Obi Roque  : 1960  Primary: Yisel Benito - Open Access (hmo) (Commercial)  Secondary:  [address] @ [facility]  [address]  [facility] [address] [place] [address]  Phone: [phone]  Fax: [phone] No data recorded  Plan of Care/Certification Expiration Date: 23      PT Visit Info:  Plan of Care/Certification Expiration Date: 23      Visit Count:  10    OUTPATIENT PHYSICAL THERAPY:OP NOTE TYPE: Treatment Note 3/10/2023       Episode  }Appt Desk             Treatment Diagnosis:  Low back pain, unspecified [M54.50]  Pain in right shoulder [M25.511]  Other chronic pain [G89.29]  Pain in left shoulder [M25.512]  Medical/Referring Diagnosis:  Low back pain, unspecified [M54.50]  Pain in right shoulder [M25.511]  Other chronic pain [G89.29]  Pain in left shoulder [M25.512]  Referring Physician:  Georges Michele MD Orders:  PT Eval and Treat   Date of Onset:  No data recorded   Allergies:   Patient has no allergy information on record. Restrictions/Precautions:  No data recorded  No data recorded   Interventions Planned (Treatment may consist of any combination of the following):    Current Treatment Recommendations: Aquatics; Strengthening; ROM; Endurance training; Balance training     Subjective Comments:   Initial:}     3/10Post Session:        210  Medications Last Reviewed:  3/10/2023  Updated Objective Findings:  None Today  Treatment   THERAPEUTIC EXERCISE: (- minutes):    Exercises per grid below to improve mobility, strength, balance, and coordination. Required minimal visual, verbal, and manual cues to promote proper body alignment, promote proper body posture, and promote proper body mechanics. Progressed resistance, range, and repetitions as indicated. Seated SKTC, LTR, Open Book      Aquatic Therapy (45 minutes): Aquatic treatment performed per flow grid for Decreased muscle strength and Decreased endurance. Cues provided for [pstire. Assistance by therapist provided for exercise instruction. Patient has difficulty with N/A. Aquatic Exercise Log       Date  2/20/23 Date:  2/27/23 Date  3/8/23 Date  3/10   Activity/ Exercise Parameters      Walking forward 5' 5' 5' 5 min   Walking backward 5' 5' 5 ' 5 min   Walking sideways 5' 5' 5 ' 5 min     Marching 30x 30x X 30 X 30 min     Goose Step         Tip toes 30x 30x X 30 X 30      Heels 30x 30x X 30 X 30     Lunges       Side step squats       LE Exercises         Hip Flex/Ext 25x 25x X 25 X 30     Hip Abd/Add 25x 25x X 25 X 30     Hip flex/knee ext    X 2 min     Calf raises         Knee Flex         Squats 30x 30x       Leg Circles         Step Ups    X 2 min   UE Exercises         Squeeze In 25x 30x X 30 X 30     Push Down         Pull Down    X 30     Bicep/Tricep       Rows/Press outs        Chi Positions         Trunk Rotation       Deep H2O/ Noodles         Stabilization         Arms only         Legs only       [employer] walk       Lower abdominal   work          Cardio         Jogging       Lap   Swimming         Stretches         Hamstrings         Heelcords         Piriformis         Neck         THERAPEUTIC EXERCISE: (-minutes):    Exercises per grid below to improve mobility, strength, balance, and coordination. Required minimal visual, verbal, and manual cues to promote proper body alignment, promote proper body posture, and promote proper body mechanics. Progressed resistance, range, and repetitions as indicated. SKTC, LTR, Open Book                Treatment/Session Summary:    Treatment Assessment:     Patient tolerated all aquatic exercises well. Patient seems very pleased with progress. Communication/Consultation:   Faxed IE to MD  Equipment provided today:  HEP  Recommendations/Intent for next treatment session: Next visit will focus on strength, gait, balance, pain.     Total Treatment Billable Duration:  45 minutes  Time In: 1100  Time Out: [address] [place]       Charge Capture  }Post Session Pain  PT Visit Info  [facility] Portal  MD Guidelines  Scanned Media  Benefits  MyChart    Future Appointments   Date Time Provider La Baker   3/14/2023  3:15 PM [facility]   3/16/2023  4:00 PM Mia Held, [facility]   3/21/2023  4:[facility]   3/23/2023  4:00 PM [facility]   3/28/2023  [facility]   3/30/2023  4:00 PM Mia Held, [facility]

## 2023-03-14 ENCOUNTER — HOSPITAL ENCOUNTER (OUTPATIENT)
Dept: PHYSICAL THERAPY | Age: 63
Setting detail: RECURRING SERIES
Discharge: HOME | End: 2023-03-17
Payer: COMMERCIAL

## 2023-03-14 PROCEDURE — 97113 AQUATIC THERAPY/EXERCISES: CPT

## 2023-03-14 NOTE — PROGRESS NOTES
[facility]  : 1960  Primary: [place] - Open Access (hmo) (Commercial)  Secondary:  [address] @ [facility]  [address] [place] [address] [facility] [address]  Phone: [phone]  Fax: [phone] No data recorded  Plan of Care/Certification Expiration Date: 23      PT Visit Info:  Plan of Care/Certification Expiration Date: 23      Visit Count:  11    OUTPATIENT PHYSICAL THERAPY:OP NOTE TYPE: Treatment Note 3/14/2023       Episode  }Appt Desk             Treatment Diagnosis:  Low back pain, unspecified [M54.50]  Pain in right shoulder [M25.511]  Other chronic pain [G89.29]  Pain in left shoulder [M25.512]  Medical/Referring Diagnosis:  Low back pain, unspecified [M54.50]  Pain in right shoulder [M25.511]  Other chronic pain [G89.29]  Pain in left shoulder [M25.512]  Referring Physician:  Mel Meza MD Orders:  PT Eval and Treat   Date of Onset:  No data recorded   Allergies:   Patient has no allergy information on record. Restrictions/Precautions:  No data recorded  No data recorded   Interventions Planned (Treatment may consist of any combination of the following):    Current Treatment Recommendations: Aquatics; Strengthening; ROM; Endurance training; Balance training     Subjective Comments: Patient reports feeling ok today. Initial:}     3/10Post Session:        210  Medications Last Reviewed:  3/14/2023  Updated Objective Findings:  None Today  Treatment   THERAPEUTIC EXERCISE: (- minutes):    Exercises per grid below to improve mobility, strength, balance, and coordination. Required minimal visual, verbal, and manual cues to promote proper body alignment, promote proper body posture, and promote proper body mechanics. Progressed resistance, range, and repetitions as indicated. Seated SKTC, LTR, Open Book      Aquatic Therapy (45 minutes): Aquatic treatment performed per flow grid for Decreased muscle strength and Decreased endurance. Cues provided for [pstire. Assistance by therapist provided for exercise instruction. Patient has difficulty with N/A. Aquatic Exercise Log       Date:  2/27/23 Date  3/8/23 Date  3/10 Date  3/14   Activity/ Exercise       Walking forward 5' 5' 5 min 5 min   Walking backward 5' 5 ' 5 min    Walking sideways 5' 5 ' 5 min 5 min     Marching 30x X 30 X 30 min      Goose Step         Tip toes 30x X 30 X 30       Heels 30x X 30 X 30      Lunges       Side step squats       LE Exercises         Hip Flex/Ext 25x X 25 X 30 X 30     Hip Abd/Add 25x X 25 X 30 X 30     Hip flex/knee ext   X 2 min X 30     Calf raises         Knee Flex         Squats 30x        Leg Circles         Step Ups   X 2 min X 30   UE Exercises         Squeeze In 30x X 30 X 30 X 30     Push Down         Pull Down   X 30      Bicep/Tricep       Rows/Press outs        Chi Positions         Trunk Rotation       Deep H2O/ Noodles         Stabilization         Arms only         Legs only       [employer] walk       Lower abdominal   work          Cardio         Jogging       Lap   Swimming         Stretches         Hamstrings         Heelcords         Piriformis         Neck         THERAPEUTIC EXERCISE: (-minutes):    Exercises per grid below to improve mobility, strength, balance, and coordination. Required minimal visual, verbal, and manual cues to promote proper body alignment, promote proper body posture, and promote proper body mechanics. Progressed resistance, range, and repetitions as indicated. SKTC, LTR, Open Book                Treatment/Session Summary:    Treatment Assessment:     Patient demonstrated good effort with all aquatic exercises. Patient  feels good about her exercises thus far. Patient needs to continue to work on core strength.   Communication/Consultation:   Faxed IE to MD  Equipment provided today:  HEP  Recommendations/Intent for next treatment session: Next visit will focus on strength, gait, balance, pain.     Total Treatment Billable Duration:  45 minutes  Time In: 1722  Time Out: [facility]       Charge Capture  }Post Session Pain  PT Visit [address] Portal  MD Guidelines  Scanned Media  Benefits  MyChart    Future Appointments   Date Time Provider La Baker   3/16/2023  4:00 PM [address], [facility]   3/21/2023  4:00 PM [address], [facility]   3/23/2023  4:00 PM [address]   3/28/2023  4:00 PM [address], [facility]   3/30/2023  4:00 PM [address], [facility]

## 2023-03-16 ENCOUNTER — HOSPITAL ENCOUNTER (OUTPATIENT)
Dept: PHYSICAL THERAPY | Age: 63
Setting detail: RECURRING SERIES
End: 2023-03-16
Payer: COMMERCIAL

## 2023-03-16 NOTE — PROGRESS NOTES
Lynn Caitlin Perla  : 1960  Primary: [employer] - Open Access (hmo)  Secondary:  [facility] @ [address]  [place] [address]  Phone: [phone]  Fax: [phone] No data recorded  Plan of Care/Certification Expiration Date: 23      PT Visit Info:  No data recorded       OUTPATIENT PHYSICAL THERAPY 3/16/2023     Appt Desk   Episode   MyChart      Ms. Perla cancelled due to family emergency.    Naye Alexis PTA    Future Appointments   Date Time Provider Department Center   3/21/2023  4:00 PM [facility]   3/23/2023  4:00 PM [facility]   3/28/2023  4:00 PM [facility]   3/30/2023  4:00 PM [facility]

## 2023-03-21 ENCOUNTER — HOSPITAL ENCOUNTER (OUTPATIENT)
Dept: PHYSICAL THERAPY | Age: 63
Setting detail: RECURRING SERIES
Discharge: HOME | End: 2023-03-24
Payer: COMMERCIAL

## 2023-03-21 PROCEDURE — 97113 AQUATIC THERAPY/EXERCISES: CPT

## 2023-03-21 NOTE — PROGRESS NOTES
performed per flow grid for Decreased muscle strength and Decreased endurance. Cues provided for [pstire. Assistance by therapist provided for exercise instruction. Patient has difficulty with N/A. Aquatic Exercise Log       Date  3/8/23 Date  3/10 Date  3/14 Date  3/21   Activity/ Exercise       Walking forward 5' 5 min 5 min 5 min   Walking backward 5 ' 5 min     Walking sideways 5 ' 5 min 5 min 5 min     Marching X 30 X 30 min       Goose Step         Tip toes X 30 X 30        Heels X 30 X 30       Lunges       Side step squats       LE Exercises         Hip Flex/Ext X 25 X 30 X 30 X 30     Hip Abd/Add X 25 X 30 X 30 X 30     Hip flex/knee ext  X 2 min X 30      Calf raises         Knee Flex         Squats         SLR    X 30     Step Ups  X 2 min X 30 X 30   UE Exercises         Squeeze In X 30 X 30 X 30      Push Down         Pull Down  X 30       Bicep/Tricep       Rows/Press outs        Chi Positions         Trunk Rotation       Deep H2O/ Noodles         Stabilization         Arms only         Legs only       [employer] walk       Lower abdominal   work          Cardio         Jogging       Lap   Swimming         Stretches         Hamstrings         Heelcords         Piriformis         Neck         THERAPEUTIC EXERCISE: (-minutes):    Exercises per grid below to improve mobility, strength, balance, and coordination. Required minimal visual, verbal, and manual cues to promote proper body alignment, promote proper body posture, and promote proper body mechanics. Progressed resistance, range, and repetitions as indicated. SKTC, LTR, Open Book                Treatment/Session Summary:    Treatment Assessment:     Patient tolerated treatment with mild discomfort. Patient states she is out of her medication and feels like that might be why she is havimg so much trouble sleeping.   Communication/Consultation:   Faxed IE to MD  Equipment provided today:

## 2023-03-23 ENCOUNTER — HOSPITAL ENCOUNTER (OUTPATIENT)
Dept: PHYSICAL THERAPY | Age: 63
Setting detail: RECURRING SERIES
Discharge: HOME | End: 2023-03-26
Payer: COMMERCIAL

## 2023-03-23 PROCEDURE — 97113 AQUATIC THERAPY/EXERCISES: CPT

## 2023-03-23 NOTE — PROGRESS NOTES
visit will focus on strength, gait, balance, pain.     Total Treatment Billable Duration:  40 minutes  Time In: 0410  Time Out: [address], [facility]       Charge Capture  }Post Session Pain  PT Visit Info  [employer] Portal  MD Guidelines  Scanned Media  Benefits  MyChart    Future Appointments   Date Time Provider La Baker   3/28/2023  4:00 PM [address], [facility]   3/30/2023  4:00 PM [address], [facility]

## 2023-03-28 ENCOUNTER — HOSPITAL ENCOUNTER (OUTPATIENT)
Dept: PHYSICAL THERAPY | Age: 63
Setting detail: RECURRING SERIES
Discharge: HOME | End: 2023-03-31
Payer: COMMERCIAL

## 2023-03-28 PROCEDURE — 97113 AQUATIC THERAPY/EXERCISES: CPT

## 2023-03-28 NOTE — PROGRESS NOTES
Decreased muscle strength and Decreased endurance. Cues provided for [pstire. Assistance by therapist provided for exercise instruction. Patient has difficulty with N/A. Aquatic Exercise Log       Date  3/10 Date  3/14 Date  3/21 Date  3/28   Activity/ Exercise       Walking forward 5 min 5 min 5 min 5 min   Walking backward 5 min   5 min   Walking sideways 5 min 5 min 5 min 5 min     Marching X 30 min        Goose Step         Tip toes X 30         Heels X 30        Interval walking    4 x 3 min in all directions   Side step squats       LE Exercises         Hip Flex/Ext X 30 X 30 X 30 X 30     Hip Abd/Add X 30 X 30 X 30 X 30     Hip flex/knee ext X 2 min X 30       Calf raises         Knee Flex         Squats         SLR   X 30 X 30     Step Ups X 2 min X 30 X 30    UE Exercises         Squeeze In X 30 X 30       Push Down         Pull Down X 30        Bicep/Tricep       Rows/Press outs        Chi Positions         Trunk Rotation    X 30   Deep H2O/ Noodles         Stabilization         Arms only         Legs only       [employer] walk       Lower abdominal   work          Cardio         Jogging       Lap   Swimming         Stretches         Hamstrings         Heelcords    3 x 30 sec     Piriformis         Neck         THERAPEUTIC EXERCISE: (-minutes):    Exercises per grid below to improve mobility, strength, balance, and coordination. Required minimal visual, verbal, and manual cues to promote proper body alignment, promote proper body posture, and promote proper body mechanics. Progressed resistance, range, and repetitions as indicated. SKTC, LTR, Open Book                Treatment/Session Summary:    Treatment Assessment:     Patient tolerated all aquatic exercises well. Patient required several rest breaks, but was pleased with how she felt at end of treatment.   Communication/Consultation:   Faxed IE to MD  Equipment provided today:

## 2023-03-30 ENCOUNTER — HOSPITAL ENCOUNTER (OUTPATIENT)
Dept: PHYSICAL THERAPY | Age: 63
Setting detail: RECURRING SERIES
End: 2023-03-30
Payer: COMMERCIAL

## 2023-03-30 PROCEDURE — 97113 AQUATIC THERAPY/EXERCISES: CPT

## 2023-03-30 NOTE — THERAPY DISCHARGE
Henrik Allen  : 1960  Primary: Marisol Muniz - Open Access (hmo) (Commercial)  Secondary:  [address] @ [facility]  [address] [place] [address]  Phone: [phone]  Fax: [phone]    Plan of Care/Certification Expiration Date: 23      PT Visit Info:  Plan of Care/Certification Expiration Date: 23      Visit Count:  15                OUTPATIENT PHYSICAL THERAPY:             OP NOTE TYPE: Progress Report and Discharge Summary 3/30/2023               Episode (Low back and shoulder pain -aquatic candidate) Appt Desk         Treatment Diagnosis:  Low back pain, unspecified [M54.50]  Pain in right shoulder [M25.511]  Other chronic pain [G89.29]  Pain in left shoulder [M25.512]    Medical/Referring Diagnosis:  Low back pain, unspecified [M54.50]  Pain in right shoulder [M25.511]  Other chronic pain [G89.29]  Pain in left shoulder [M25.512]  Referring Physician:  Sofía Tarango PA-C MD Orders:  PT Eval and Treat   Return MD Appt:    No future appointments. Date of Onset:       Allergies:  Patient has no allergy information on record. Restrictions/Precautions:           Medications Last Reviewed:  3/30/2023             Henrik Allen has been to Visit Count:  15 visits including IE. She reports little to no improvement with aquatic therapy. She does return to MD on 23. I believe her appt is with pain management on this date and recommending that she go this route as PT has not met goals. PTA/PT/Patient recommending Dc'ing aquatic therapy - will c/u after pain management appt. SUBJECTIVE   History of Injury/Illness (Reason for Referral): I woke up \"on  last year with pain from my chest from top to bottom and I did nothing to bring it on. The bunt of the pain was my L hip and radiate around my back to the other side of my back.   My shoulders weren't as bad, but it was like I was in a Triathalon and my body was referral,  Hailey Heart PT     Referring Physician Signature: Fanta Torres PA-C _______________________________ Date _____________        Post Session Pain  Charge Capture  PT Visit Info MD Guidelines  [place]

## 2023-03-30 NOTE — PROGRESS NOTES
Jocelin Olivier  : 1960  Primary: [facility] - Open Access (hmo) (Commercial)  Secondary:  [address] @ [facility]  [address] [facility] [place] [address]  Phone: [phone]  Fax: [phone] No data recorded  Plan of Care/Certification Expiration Date: 23      PT Visit Info:  Plan of Care/Certification Expiration Date: 23      Visit Count:  15    OUTPATIENT PHYSICAL THERAPY:OP NOTE TYPE: Treatment Note 3/30/2023       Episode  }Appt Desk             Treatment Diagnosis:  Low back pain, unspecified [M54.50]  Pain in right shoulder [M25.511]  Other chronic pain [G89.29]  Pain in left shoulder [M25.512]  Medical/Referring Diagnosis:  Low back pain, unspecified [M54.50]  Pain in right shoulder [M25.511]  Other chronic pain [G89.29]  Pain in left shoulder [M25.512]  Referring Physician:  Jose D Rodriguez MD Orders:  PT Eval and Treat   Date of Onset:  No data recorded   Allergies:   Patient has no allergy information on record. Restrictions/Precautions:  No data recorded  No data recorded   Interventions Planned (Treatment may consist of any combination of the following):    Current Treatment Recommendations: Aquatics; Strengthening; ROM; Endurance training; Balance training     Subjective Comments: Patient reports she had a really bad night last night. She states she has been hurting for last a few days. Initial:}     6\/10Post Session:        5/10  Medications Last Reviewed:  3/30/2023  Updated Objective Findings:  None Today  Treatment   THERAPEUTIC EXERCISE: (- minutes):    Exercises per grid below to improve mobility, strength, balance, and coordination. Required minimal visual, verbal, and manual cues to promote proper body alignment, promote proper body posture, and promote proper body mechanics. Progressed resistance, range, and repetitions as indicated. Seated SKTC, LTR, Open Book      Aquatic Therapy (45 minutes):  Aquatic treatment performed per flow grid for Decreased muscle strength and Decreased endurance. Cues provided for [pstire. Assistance by therapist provided for exercise instruction. Patient has difficulty with N/A. Aquatic Exercise Log       Date  3/14 Date  3/21 Date  3/28 Date  3/30   Activity/ Exercise       Walking forward 5 min 5 min 5 min 5 min   Walking backward   5 min 5 min   Walking sideways 5 min 5 min 5 min 5 min     Marching         Goose Step         Tip toes         Heels         Interval walking   4 x 3 min in all directions    Side step squats       LE Exercises         Hip Flex/Ext X 30 X 30 X 30 X 30     Hip Abd/Add X 30 X 30 X 30 X 30     Hip flex/knee ext X 30   X 30     Calf raises    X 30     Knee Flex         Squats         SLR  X 30 X 30 X 30     Step Ups X 30 X 30  X 30   UE Exercises         Squeeze In X 30        Push Down         Pull Down         Bicep/Tricep       Rows/Press outs        Chi Positions         Trunk Rotation   X 30    Deep H2O/ Noodles         Stabilization         Arms only         Legs only       [employer] walk       Lower abdominal   work          Cardio         Jogging       Lap   Swimming         Stretches         Hamstrings         Heelcords   3 x 30 sec      Piriformis         Neck         THERAPEUTIC EXERCISE: (-minutes):    Exercises per grid below to improve mobility, strength, balance, and coordination. Required minimal visual, verbal, and manual cues to promote proper body alignment, promote proper body posture, and promote proper body mechanics. Progressed resistance, range, and repetitions as indicated. SKTC, LTR, Open Book                Treatment/Session Summary:    Treatment Assessment:     Patient tolerated all aquatic exercises well today. Patient is scheduled to see doctor next week for next course of action.   Communication/Consultation:   Faxed IE to MD  Equipment provided today:  HEP  Recommendations/Intent for